# Patient Record
Sex: MALE | Race: WHITE
[De-identification: names, ages, dates, MRNs, and addresses within clinical notes are randomized per-mention and may not be internally consistent; named-entity substitution may affect disease eponyms.]

---

## 2021-08-02 NOTE — XRAY REPORT
PROCEDURE:  Ribs w/PA Chest RT

 

INDICATIONS:  fall, R rib pain

 

TECHNIQUE:  3 views of the right ribs were acquired, along with a single view chest.  

 

COMPARISON:  None

 

FINDINGS:  

 

Surgical changes and devices:  None.  

 

Bones and chest wall: The right fourth through eighth ribs have minimally displaced fractures. 

 

Lungs and pleura:  No pleural effusions or pneumothorax.  Lungs appear clear.  

 

Mediastinum:  Mediastinal contours appear normal.  Heart size is normal.  

 

IMPRESSION: Right fourth through eighth rib minimally displaced fractures.

 

Reviewed by: Sylvester Kumar on 8/2/2021 7:18 PM PDT

Approved by: Sylvester Kumar on 8/2/2021 7:18 PM PDT

 

 

Station ID:  [id number]

## 2021-08-02 NOTE — ED PHYSICIAN DOCUMENTATION
History of Present Illness





- Stated complaint


Stated Complaint: R SIDE PX





- Chief complaint


Chief Complaint: Trauma Ch/Bk





- History obtained from


History obtained from: Patient





- History of Present Illness


Timing: How many days ago


Pain level max: 6


Pain level now: 4





- Additonal information


Additional information: 





Patient is a 55-year-old male, was paragliding 3 days ago when he fell and 

landed on the right ribs.  Has had increasing pain since that time.  Worse with 

movement, better with rest. No head, neck, back pain





Review of Systems


Constitutional: denies: Fever, Chills


Respiratory: denies: Dyspnea, Cough, Hemoptysis, Wheezing


GI: denies: Vomiting


Musculoskeletal: denies: Neck pain, Back pain


Neurologic: denies: Headache, Head injury





PD PAST MEDICAL HISTORY





- Past Medical History


Past Medical History: No





- Past Surgical History


Past Surgical History: Yes


General: Cholecystectomy





- Present Medications


Home Medications: 


                                Ambulatory Orders











 Medication  Instructions  Recorded  Confirmed


 


Lidocaine Patch 5% [Lidoderm Patch] 1 patch TOP DAILY PRN #10 patch 08/02/21 


 


Meloxicam [Mobic] 1 tablet PO DAILY PRN 08/02/21 08/02/21


 


Oxycodone HCl/Acetaminophen 1 - 2 each PO Q6H PRN #20 tablet 08/02/21 





[Percocet 5-325 mg Tablet]   














- Allergies


Allergies/Adverse Reactions: 


                                    Allergies











Allergy/AdvReac Type Severity Reaction Status Date / Time


 


No Known Drug Allergies Allergy   Verified 08/02/21 17:52














- Social History


Does the pt smoke?: No


Smoking Status: Never smoker


Does the pt drink ETOH?: No


Does the pt have substance abuse?: No





- Immunizations


Immunizations are current?: Yes





PD ED PE NORMAL





- Vitals


Vital signs reviewed: Yes





- General


General: Alert and oriented X 3, No acute distress





- HEENT


HEENT: Moist mucous membranes





- Neck


Neck: Supple, no meningeal sign, No bony TTP





- Cardiac


Cardiac: RRR, Strong equal pulses





- Respiratory


Respiratory: No respiratory distress, Clear bilaterally, Other (Tenderness To p

alpation over the right posterior lateral ribs.  No crepitus.  No ecchymosis.  

Tenderness is over the fourth through 10th ribs.)





- Back


Back: No spinal TTP





- Derm


Derm: Warm and dry





- Extremities


Extremities: Normal ROM s pain





- Neuro


Neuro: Alert and oriented X 3





Results





- Vitals


Vitals: 


                               Vital Signs - 24 hr











  08/02/21 08/02/21





  17:52 19:42


 


Temperature 36.6 C 


 


Heart Rate 68 69


 


Respiratory 16 16





Rate  


 


Blood Pressure 158/96 H 163/98 H


 


O2 Saturation 96 95








                                     Oxygen











O2 Source                      Room air

















- Rads (name of study)


  ** Right ribs with chest x-ray


Radiology: Final report received, EMP read contemporaneously, See rad report (: 

Right fourth through eighth rib minimally displaced fractures. )





PD MEDICAL DECISION MAKING





- ED course


Complexity details: reviewed results, re-evaluated patient, considered 

differential, d/w patient


ED course: 


Patient with fourth through eighth right rib fractures, minimally displaced.  

This occurred several days ago.  We will prescribe pain medication for home.  

Patient is well-appearing, nontoxic.  Afebrile.  No hypoxia or respiratory 

distress.  Patient counseled regarding signs and symptoms for which I believe 

and urgent re-evaluation would be necessary. Patient with good understanding of 

and agreement to plan and is comfortable going home at this time





This document was made in part using voice recognition software. While efforts 

are made to proofread this document, sound alike and grammatical errors may 

occur.





No hemo or pneumothorax





Departure





- Departure


Disposition: 01 Home, Self Care


Clinical Impression: 


Ribs, multiple fractures


Qualifiers:


 Encounter type: initial encounter Fracture type: closed Laterality: right 

Qualified Code(s): S22.41XA - Multiple fractures of ribs, right side, initial 

encounter for closed fracture





Condition: Good


Instructions:  ED Fx Rib


Follow-Up: 


your,doctor in 1 week for recheck [Other]


Prescriptions: 


Lidocaine Patch 5% [Lidoderm Patch] 1 patch TOP DAILY PRN #10 patch


 PRN Reason: pain


Oxycodone HCl/Acetaminophen [Percocet 5-325 mg Tablet] 1 - 2 each PO Q6H PRN #20

tablet


 PRN Reason: pain


Comments: 


You do have 4 rib fractures on your x-ray.  Use the pain medication as needed 

for pain.  Follow-up with your doctor for further care.  Return if you worsen.





I am prescribing a short course of narcotic pain medication for you. These are 

potentially dangerous and addictive medications that should be used carefully. 


These medications may constipate you. Take an over-the-counter stool softener 

(docusate) twice daily with plenty of water while taking these medications. If 

you go 24 hours without a bowel movement, take over-the-counter miralax, per 

package instructions.


Do not drink or drive while taking these medications. 


If you received narcotic or sedating medications while in the emergency 

department, do not drive for 24 hours.


Store this medication in a safe, secure place and out of reach of children. 


It is a violation of federal law to give or sell this medication to another 

person or to use in a manner other than prescribed.


The ED will not refill narcotic prescriptions, including prescriptions lost or 

stolen.


To dispose of unwanted medications:


1. [facility] at [address] in 

[place] has a medication drop box. They accept prescription medications (in 

pill form) Monday through Friday 9:00 a.m. to 5:00 p.m. 


2. [facility] accepts prescription medications (in

pill form only) for disposal year round. Call [phone] for more 

information. 


3. Contact the [facility] for the next [facility] sponsored prescription 

drug collection event. [phone], [phone], or [phone] 

[phone];


Discharge Date/Time: 08/02/21 19:54

## 2023-05-21 ENCOUNTER — HOSPITAL ENCOUNTER (EMERGENCY)
Dept: HOSPITAL 76 - ED | Age: 58
Discharge: HOME | End: 2023-05-21
Payer: COMMERCIAL

## 2023-05-21 VITALS — DIASTOLIC BLOOD PRESSURE: 118 MMHG | SYSTOLIC BLOOD PRESSURE: 154 MMHG
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
## 2023-05-21 DIAGNOSIS — S90.32XA: Primary | ICD-10-CM

## 2023-05-21 PROCEDURE — 99283 EMERGENCY DEPT VISIT LOW MDM: CPT

## 2023-05-21 NOTE — ED PHYSICIAN DOCUMENTATION
PD HPI LOWER EXT INJURY





- Stated complaint


Stated Complaint: FELL, L FOOT PX





- Chief complaint


Chief Complaint: Ext Problem





- History obtained from


History obtained from: Patient





- History of Present Illness


PD HPI LOW EXT INJURY LOCATION: Left, Foot


Where injury occurred: Home


Timing - onset: How many hours ago (2)


Pain level max: 5


Pain level now: 3


Improved by: Rest, Ice


Worsened by: Moving, Palpating


Associated symptoms: Swelling.  No: Numbness, Tingling


Contributing factors: No: Anticoagulated





- Additional information


Additional information: 





Patient is a 57-year-old male who presents to the emergency department with left

foot pain.  He states that he tripped over a  bucket about 2 hours prior 

to arrival.  This is at home.  Now has swelling and bruising to the dorsum of 

the left foot.  Better with rest and ice.  Worse with walking.  He states he has

been able to walk on the foot since the event.





Review of Systems


Neurologic: denies: Headache, Head injury





PD PAST MEDICAL HISTORY





- Past Medical History


Past Medical History: Yes


Cardiovascular: None


Respiratory: Sleep apnea


Neuro: Headaches


Endocrine/Autoimmune: None


GI: None


: None


HEENT: None


Psych: None


Musculoskeletal: Chronic back pain


Derm: None





- Past Surgical History


Past Surgical History: Yes


General: Cholecystectomy





- Present Medications


Home Medications: 


                                Ambulatory Orders











 Medication  Instructions  Recorded  Confirmed


 


Meloxicam [Mobic] 15 mg PO DAILY PRN 08/02/21 05/21/23














- Allergies


Allergies/Adverse Reactions: 


                                    Allergies











Allergy/AdvReac Type Severity Reaction Status Date / Time


 


No Known Drug Allergies Allergy   Verified 05/21/23 18:43














- Social History


Does the pt smoke?: No


Smoking Status: Never smoker


Does the pt drink ETOH?: Yes


ETOH Use: Liquor


Does the pt have substance abuse?: No





- Immunizations


Immunizations are current?: Yes





PD ED PE NORMAL





- Vitals


Vital signs reviewed: Yes





- General


General: Alert and oriented X 3, No acute distress





- Derm


Derm: Warm and dry





- Extremities


Extremities: Other





- Neuro


Neuro: Alert and oriented X 3





- Free text exam


Free text exam: 





Swelling and ecchymosis to the dorsum of the left foot.  Mild tenderness over 

the dorsum of the foot as well.  Full range of motion of the toes without pain. 

 Full range of motion of the ankle without pain as well.  No other tenderness 

over the foot other than the dorsum.  Neurovascular intact.  No tenderness on 

the plantar aspect.





Results





- Vitals


Vitals: 


                               Vital Signs - 24 hr











  05/21/23 05/21/23





  18:39 20:27


 


Temperature 36.9 C 


 


Heart Rate 82 77


 


Respiratory 17 20





Rate  


 


Blood Pressure 160/96 H 154/118 H


 


O2 Saturation 96 95








                                     Oxygen











O2 Source                      Room air

















- Rads (name of study)


  ** Left foot x-ray


Relevant Findings:: Final report received, See rad report





PD Medical Decision Making





- ED course


Complexity details: reviewed results, re-evaluated patient, considered 

differential, d/w patient, d/w family


ED course: 





Patient with what appears to be a contusion of the dorsum of the left foot.  No 

acute findings on x-ray.  No fracture or dislocation.  Ambulating without 

difficulty.  Declines anything home for pain.  He does request an Ace wrap, this

 was applied.  Patient counseled regarding signs and symptoms for which I 

believe and urgent re-evaluation would be necessary. Patient with good under

standing of and agreement to plan and is comfortable going home at this time





This document was made in part using voice recognition software. While efforts 

are made to proofread this document, sound alike and grammatical errors may 

occur.





Departure





- Departure


Disposition: 01 Home, Self Care


Clinical Impression: 


Contusion, foot


Qualifiers:


 Encounter type: initial encounter Laterality: left Qualified Code(s): S90.32XA 

- Contusion of left foot, initial encounter





Condition: Good


Instructions:  ED Contusion Foot


Follow-Up: 


your,doctor as needed [Other]


Comments: 


Please follow-up with your doctor for further care.  You can use Motrin or 

Tylenol as needed for pain.  Your x-ray does not show any acute abnormalities 

today.  You do appear to have erosion in the first metatarsal head, possible 

inflammatory arthritis such as erosive osteoarthritis, this can be followed up 

with your doctor.  Your x-ray reading as below.











PROCEDURE: Foot 3 View LT 





INDICATIONS: Trauma 





TECHNIQUE: Without and with discussed with Dr. mckee. views of the foot were 

acquired. 





COMPARISON: None. 





FINDINGS: 





Bones: Metatarsus adductus and hallux valgus. No fractures or dislocations. No 

suspicious bony 


lesions. 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

 There is bony 


erosion in the first metatarsal head. 





Soft tissues: No suspicious soft tissue calcifications or masses. Dorsal soft 

tissue swelling. 








IMPRESSION: 





1. No acute osseous abnormality. 


2. Dorsal soft tissue swelling. 


3. Metatarsus adductus and hallux valgus. 


4. 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 Bony 

erosion in the first


metatarsal head. Recommend clinical correlation for inflammatory arthritis such 

as erosive OA. 








Discharge Date/Time: 05/21/23 20:28

## 2023-05-21 NOTE — XRAY REPORT
PROCEDURE:  Foot 3 View LT

 

INDICATIONS:  Trauma

 

TECHNIQUE:  Without and with discussed with Dr. mckee. views of the foot were acquired.  

 

COMPARISON:  None.

 

FINDINGS:  

 

Bones:  Metatarsus adductus and hallux valgus. No fractures or dislocations.  No suspicious bony lesi
ons. 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  There is bony erosion i
n the first metatarsal head.

 

Soft tissues:  No suspicious soft tissue calcifications or masses.   Dorsal soft tissue swelling.

 

 

IMPRESSION:  

 

1. No acute osseous abnormality.

2. Dorsal soft tissue swelling.

3. Metatarsus adductus and hallux valgus.

4. 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 Bony erosion in the first 
metatarsal head. Recommend clinical correlation for inflammatory arthritis such as erosive OA.

 

Reviewed by: Fanta Cross MD on 5/21/2023 7:55 PM PDT

Approved by: Fanta Cross MD on 5/21/2023 7:55 PM PDT

 

 

Station ID:  [id number]